# Patient Record
Sex: MALE | Race: ASIAN | NOT HISPANIC OR LATINO
[De-identification: names, ages, dates, MRNs, and addresses within clinical notes are randomized per-mention and may not be internally consistent; named-entity substitution may affect disease eponyms.]

---

## 2020-12-04 ENCOUNTER — APPOINTMENT (OUTPATIENT)
Dept: UROLOGY | Facility: CLINIC | Age: 65
End: 2020-12-04
Payer: MEDICARE

## 2020-12-04 VITALS
OXYGEN SATURATION: 97 % | RESPIRATION RATE: 18 BRPM | TEMPERATURE: 98.1 F | BODY MASS INDEX: 23.07 KG/M2 | SYSTOLIC BLOOD PRESSURE: 117 MMHG | DIASTOLIC BLOOD PRESSURE: 84 MMHG | WEIGHT: 147 LBS | HEART RATE: 71 BPM | HEIGHT: 67 IN

## 2020-12-04 DIAGNOSIS — Z78.9 OTHER SPECIFIED HEALTH STATUS: ICD-10-CM

## 2020-12-04 DIAGNOSIS — Z80.9 FAMILY HISTORY OF MALIGNANT NEOPLASM, UNSPECIFIED: ICD-10-CM

## 2020-12-04 PROCEDURE — 99072 ADDL SUPL MATRL&STAF TM PHE: CPT

## 2020-12-04 PROCEDURE — 99204 OFFICE O/P NEW MOD 45 MIN: CPT

## 2020-12-04 NOTE — ADDENDUM
[FreeTextEntry1] : Entered by Don Contreras, acting as scribe for Dr. Pepe Shin.\par \par The documentation recorded by the scribe accurately reflects the service I personally performed and the decisions made by me.\par

## 2020-12-04 NOTE — LETTER BODY
[FreeTextEntry1] : Julien Benz MD\par [address]\[place] [address]\par [phone]\par \par Dear Dr. Benz,\par \par Reason for Visit: BPH. Elevated PSA.\par \par This is a 65 year-old Cantonese-speaking retired gentleman with elevated PSA and symptoms of BPH. Patient is here today for evaluation. Patient reports he has weak uroflow, frequency, and hesitancy. He denies any hematuria or urinary incontinence. His symptoms are aggravated by hydration. He denies any alleviating factors. He has not tried any medical therapy previously. He reports no pain. His recent PSA was 78. Patient reports he has not had a previous prostate biopsy. All other review of systems are negative. He has cancer in his family medical history through his father. He has no previous surgical history. Past medical history, family history and social history were inquired and were noncontributory to current condition. The patient does not use tobacco or drink alcohol. Medications and allergies were reviewed. He has no known allergies to medication. \par \par On examination, the patient is a healthy-appearing gentleman in no acute distress. He is alert and oriented follows commands. He has normal mood and affect. He is normocephalic. Neck is supple. Oral no thrush Respirations are unlabored. His abdomen is soft and nontender. Bladder is nonpalpable. No CVA tenderness. Neurologically he is grossly intact. No peripheral edema. Skin without gross abnormality. He has normal male external genitalia. Normal meatus. Bilateral testes are descended intrascrotally and normal to palpation. On rectal examination, there is normal sphincter tone. The prostate is clinically benign without focal induration or nodularity.\par \par ASSESSMENT: BPH. Elevated PSA.\par \par I counseled the patient on the various etiology of his symptoms. I discussed the natural history of BPH and the treatment options available. I discussed the options of conservative management with fluid in dietary restrictions, herbal therapy, medical therapy, and minimally invasive procedures.  Risk and benefits were discussed. I answered his questions. I recommended he begin a trial of Flomax. I discussed the potential side effects of the medication. I counseled the patient on its use and side effects. If the patient develops any side effects, the patient will discontinue the medication and contact me. He will obtain BMP today to establish baseline. In terms of his elevated PSA,  I discussed with him the risk of occult malignancy. I discussed the various etiologies of PSA elevation, including enlargement of prostate, inflammation or infection, and prostate cancer. Patient would like to confirm the diagnosis with repeating the PSA. I recommended he obtain a prostate MRI for further evaluation.  I counseled the patient regarding the procedure. The risks and benefits were discussed. Alternatives were given. I answered the patient questions. The patient will take the necessary preparations for the procedure, including fleet enema. If his PSA remains elevated, then he may then consider a prostate biopsy. He will also obtain urine culture and urinalysis today to evaluate for infection. Risks and alternatives were discussed. I answered the patient questions. The patient will follow-up as directed and will contact me with any questions or concerns. Thank you for the opportunity to participate in the care of Mr. SHAW. I will keep you updated on his progress.\par \par Plan: Repeat PSA. Trial of Flomax. Urine culture. Urinalysis. BMP. Prostate MRI. Fleet enema. Follow up in 1 month.

## 2020-12-08 LAB
ANION GAP SERPL CALC-SCNC: 10 MMOL/L
APPEARANCE: CLEAR
BACTERIA UR CULT: NORMAL
BACTERIA: NEGATIVE
BILIRUBIN URINE: NEGATIVE
BLOOD URINE: NEGATIVE
BUN SERPL-MCNC: 13 MG/DL
CALCIUM SERPL-MCNC: 10 MG/DL
CHLORIDE SERPL-SCNC: 103 MMOL/L
CO2 SERPL-SCNC: 25 MMOL/L
COLOR: NORMAL
CREAT SERPL-MCNC: 0.86 MG/DL
GLUCOSE QUALITATIVE U: NEGATIVE
GLUCOSE SERPL-MCNC: 105 MG/DL
HYALINE CASTS: 0 /LPF
KETONES URINE: NEGATIVE
LEUKOCYTE ESTERASE URINE: NEGATIVE
MICROSCOPIC-UA: NORMAL
NITRITE URINE: NEGATIVE
PH URINE: 6.5
POTASSIUM SERPL-SCNC: 4.3 MMOL/L
PROTEIN URINE: NEGATIVE
PSA FREE FLD-MCNC: 10 %
PSA FREE SERPL-MCNC: 8.14 NG/ML
PSA SERPL-MCNC: 79.5 NG/ML
RED BLOOD CELLS URINE: 1 /HPF
SODIUM SERPL-SCNC: 138 MMOL/L
SPECIFIC GRAVITY URINE: 1.01
SQUAMOUS EPITHELIAL CELLS: 0 /HPF
UROBILINOGEN URINE: NORMAL
WHITE BLOOD CELLS URINE: 1 /HPF

## 2020-12-09 ENCOUNTER — NON-APPOINTMENT (OUTPATIENT)
Age: 65
End: 2020-12-09

## 2020-12-15 ENCOUNTER — NON-APPOINTMENT (OUTPATIENT)
Age: 65
End: 2020-12-15

## 2020-12-15 ENCOUNTER — APPOINTMENT (OUTPATIENT)
Dept: UROLOGY | Facility: CLINIC | Age: 65
End: 2020-12-15
Payer: MEDICARE

## 2020-12-15 VITALS
SYSTOLIC BLOOD PRESSURE: 109 MMHG | RESPIRATION RATE: 18 BRPM | DIASTOLIC BLOOD PRESSURE: 74 MMHG | TEMPERATURE: 97.8 F | WEIGHT: 141 LBS | OXYGEN SATURATION: 97 % | BODY MASS INDEX: 22.08 KG/M2 | HEART RATE: 98 BPM

## 2020-12-15 PROCEDURE — 99214 OFFICE O/P EST MOD 30 MIN: CPT

## 2020-12-15 PROCEDURE — 99072 ADDL SUPL MATRL&STAF TM PHE: CPT

## 2020-12-15 NOTE — LETTER BODY
[FreeTextEntry1] : Julien Benz MD\par [address]\[place] [address]\par [phone]\par \par Dear Dr. Benz,\par \par Reason for Visit: BPH. Elevated PSA.\par \par This is a 65 year-old Cantonese-speaking retired gentleman with elevated PSA and BPH. He returns today for follow-up. Since his last visit, he obtained a prostate MRI, which appears suspicious for malignancy. However, the final results are still pending. The patient reports taking Flomax QD regularly without any side effects or difficulties with the medication. The patient denies any hematuria or urinary incontinence. Patient denies any pain. All other review of systems are negative. Past medical history, family history and social history were unchanged. Medications and allergies were reviewed. He has no known allergies to medication. \par \par On examination, the patient is a healthy-appearing gentleman in no acute distress. He is alert and oriented follows commands. He has normal mood and affect. He is normocephalic. Neck is supple. Oral no thrush Respirations are unlabored. His abdomen is soft and nontender. Bladder is nonpalpable. No CVA tenderness. Neurologically he is grossly intact. No peripheral edema. Skin without gross abnormality. He has normal male external genitalia. Normal meatus. Bilateral testes are descended intrascrotally and normal to palpation. On rectal examination, there is normal sphincter tone. The prostate is clinically benign without focal induration or nodularity.\par \par His BMP demonstrated normal renal functions, creatinine 0.86. His PSA was 79.5, which is within normal limits. His urinalysis was unremarkable. His urine culture was negative.\par \par His recent prostate MRI appears suspicious for malignancy.\par \par ASSESSMENT: BPH. Elevated PSA.\par \par I counseled the patient. In terms of his BPH, I recommended the patient continue taking Flomax QD regularly as directed. In terms of his elevated PSA, I discussed with the patient that his prostate MRI appears suspicious for malignancy. However, the final report is still pending. I recommended the patient undergo fusion prostate biopsy for further evaluation of his condition. I counseled the patient regarding the procedure. The risks and benefits were discussed. Alternatives were given. I answered the patient questions. The patient will take the necessary preparations for the procedure, including fleet enema and Cefdinir. Risks and alternatives were discussed. I answered the patient questions. The patient will follow-up as directed and will contact me with any questions or concerns. Thank you for the opportunity to participate in the care of Mr. SHAW. I will keep you updated on his progress.\par \par Plan: Continue Flomax. Fusion prostate biopsy. Fleet enema. Cefdinir. Follow-up as directed.

## 2020-12-28 ENCOUNTER — APPOINTMENT (OUTPATIENT)
Dept: UROLOGY | Facility: CLINIC | Age: 65
End: 2020-12-28
Payer: MEDICARE

## 2020-12-28 ENCOUNTER — OUTPATIENT (OUTPATIENT)
Dept: OUTPATIENT SERVICES | Facility: HOSPITAL | Age: 65
LOS: 1 days | End: 2020-12-28
Payer: MEDICARE

## 2020-12-28 ENCOUNTER — TRANSCRIPTION ENCOUNTER (OUTPATIENT)
Age: 65
End: 2020-12-28

## 2020-12-28 VITALS
TEMPERATURE: 97.7 F | SYSTOLIC BLOOD PRESSURE: 120 MMHG | HEART RATE: 83 BPM | DIASTOLIC BLOOD PRESSURE: 78 MMHG | OXYGEN SATURATION: 99 %

## 2020-12-28 DIAGNOSIS — R35.0 FREQUENCY OF MICTURITION: ICD-10-CM

## 2020-12-28 PROCEDURE — 76872 US TRANSRECTAL: CPT | Mod: 26

## 2020-12-28 PROCEDURE — 76942 ECHO GUIDE FOR BIOPSY: CPT | Mod: 26,59

## 2020-12-28 PROCEDURE — 55700: CPT

## 2020-12-28 PROCEDURE — 76377 3D RENDER W/INTRP POSTPROCES: CPT | Mod: 26

## 2020-12-28 PROCEDURE — 76872 US TRANSRECTAL: CPT

## 2020-12-28 PROCEDURE — 76942 ECHO GUIDE FOR BIOPSY: CPT | Mod: 59

## 2020-12-28 RX ORDER — ENEMA 19; 7 G/133ML; G/133ML
7-19 ENEMA RECTAL
Qty: 1 | Refills: 0 | Status: COMPLETED | COMMUNITY
Start: 2020-12-15 | End: 2020-12-28

## 2020-12-29 ENCOUNTER — NON-APPOINTMENT (OUTPATIENT)
Age: 65
End: 2020-12-29

## 2020-12-29 ENCOUNTER — APPOINTMENT (OUTPATIENT)
Dept: UROLOGY | Facility: CLINIC | Age: 65
End: 2020-12-29
Payer: MEDICARE

## 2020-12-29 VITALS
TEMPERATURE: 98 F | SYSTOLIC BLOOD PRESSURE: 109 MMHG | RESPIRATION RATE: 18 BRPM | DIASTOLIC BLOOD PRESSURE: 76 MMHG | WEIGHT: 145 LBS | HEART RATE: 100 BPM | BODY MASS INDEX: 22.71 KG/M2 | OXYGEN SATURATION: 96 %

## 2020-12-29 PROCEDURE — 99072 ADDL SUPL MATRL&STAF TM PHE: CPT

## 2020-12-29 PROCEDURE — 51798 US URINE CAPACITY MEASURE: CPT

## 2020-12-29 PROCEDURE — 51702 INSERT TEMP BLADDER CATH: CPT

## 2020-12-29 PROCEDURE — 99214 OFFICE O/P EST MOD 30 MIN: CPT | Mod: 25

## 2020-12-29 RX ORDER — CEFDINIR 300 MG/1
300 CAPSULE ORAL
Qty: 6 | Refills: 0 | Status: COMPLETED | COMMUNITY
Start: 2020-12-15 | End: 2020-12-29

## 2020-12-30 DIAGNOSIS — R97.20 ELEVATED PROSTATE SPECIFIC ANTIGEN [PSA]: ICD-10-CM

## 2020-12-30 LAB — CORE LAB BIOPSY: NORMAL

## 2020-12-30 NOTE — HISTORY OF PRESENT ILLNESS
[FreeTextEntry1] : Reason for visit urine retention\par \par Patient with prostate biopsy yesterday and has been unable to urinate since this morning.  He has been taking Flomax regularly.\par \par PVR was 500 cc.\par \par A new 16 [place] catheter was placed without difficulty.  The patient was covered with ciprofloxacin.  Patient will follow-up in one month for catheter change.  Approximately 500 cc of clear urine was drained from the bladder.\par \par Patient will increase Flomax to twice daily.  He will return in 1 week for voiding trial.

## 2021-01-05 ENCOUNTER — APPOINTMENT (OUTPATIENT)
Dept: UROLOGY | Facility: CLINIC | Age: 66
End: 2021-01-05
Payer: MEDICARE

## 2021-01-05 VITALS
OXYGEN SATURATION: 97 % | HEART RATE: 87 BPM | BODY MASS INDEX: 22.24 KG/M2 | DIASTOLIC BLOOD PRESSURE: 80 MMHG | SYSTOLIC BLOOD PRESSURE: 120 MMHG | WEIGHT: 142 LBS | RESPIRATION RATE: 18 BRPM | TEMPERATURE: 98 F

## 2021-01-05 PROCEDURE — 99072 ADDL SUPL MATRL&STAF TM PHE: CPT

## 2021-01-05 PROCEDURE — 99214 OFFICE O/P EST MOD 30 MIN: CPT | Mod: 25

## 2021-01-05 PROCEDURE — 51700 IRRIGATION OF BLADDER: CPT

## 2021-01-05 PROCEDURE — A4218: CPT | Mod: NC

## 2021-01-05 RX ORDER — ALPRAZOLAM 0.5 MG/1
0.5 TABLET, EXTENDED RELEASE ORAL DAILY
Qty: 14 | Refills: 0 | Status: DISCONTINUED | COMMUNITY
Start: 2021-01-05 | End: 2021-01-05

## 2021-01-11 NOTE — LETTER BODY
[FreeTextEntry1] : Julien Benz MD\par [address]\[place] [address]\par [phone]\par \par Dear Dr. Benz,\par \par Reason for visit: Prostate cancer. \par \par This is a 65 year-old gentleman with elevated PSA, status post prostate biopsy. Patient returns today for trial of void and to discuss the results of the biopsy. He is accompanied by his wife. He was unable to urinate the next morning following his prostate biopsy last week. A Hill catheter was placed. He has some mild hematuria which is improving. He denies any fevers or chills. He denies any pain. The patient is currently taking Casodex as directed. Patient denies any changes in health. The past medical history and family history and social history are unchanged. All other review of systems are negative. Patient denies any changes in medications. Medication list was reconciled.\par \par On examination, the patient is a healthy-appearing gentleman in no acute distress. He is alert and oriented follows commands. He has normal mood and affect. He is normocephalic. Neck is supple. Oral no thrush Respirations are unlabored. His abdomen is soft and nontender. Bladder is nonpalpable. No CVA tenderness. Neurologically he is grossly intact. No peripheral edema. Skin without gross abnormality. He has normal male external genitalia. Normal meatus. Bilateral testes are descended intrascrotally and normal to palpation. On rectal examination, there is normal sphincter tone. The prostate is clinically benign without focal induration or nodularity.\par \par Prostate biopsy demonstrated evidence of [place] 9 adenocarcinoma of the prostate involving 15 of 15 cores.\par \par Patient was given a voiding trial today. Patient was able to void spontaneously.\par \par Assessment: Advanced prostate cancer.\par \par I counseled the patient on its clinical significance. I advised the patient that he has advanced prostate cancer. I discussed the risk of metastatic disease and the probability of organ confined disease. I discussed the treatment options available to him including expectant management, hormonal therapy, radiation, and surgery. The risks and benefits of each options were discussed in detail as well quality of life issues. I answered his questions. I recommended he consider radiation therapy and see radiation oncology. He is currently taking Casodex in preparation for hormonal therapy. He will follow-up in 2 weeks to begin Eligard injections. The patient will obtain CT pelvis and NM SPECT bone scan to evaluate for metastatic disease. Risks and alternatives were discussed. I answered the patient questions. The patient will follow-up as directed and will contact me with any questions or concerns. Thank you for the opportunity to participate in the care of Mr. SHAW. I will keep you updated on his progress.\par \par Plan: CT pelvis. Bone scan. Continue Casodex. Follow-up in 2 weeks for Eligard.

## 2021-01-18 RX ORDER — PSYLLIUM HUSK 0.4 G
1000-800 CAPSULE ORAL
Refills: 0 | Status: ACTIVE | COMMUNITY
Start: 2021-01-18 | End: 1900-01-01

## 2021-01-19 ENCOUNTER — APPOINTMENT (OUTPATIENT)
Dept: UROLOGY | Facility: CLINIC | Age: 66
End: 2021-01-19
Payer: MEDICARE

## 2021-01-19 VITALS
BODY MASS INDEX: 22.24 KG/M2 | SYSTOLIC BLOOD PRESSURE: 100 MMHG | RESPIRATION RATE: 18 BRPM | DIASTOLIC BLOOD PRESSURE: 71 MMHG | OXYGEN SATURATION: 98 % | HEART RATE: 100 BPM | WEIGHT: 142 LBS | TEMPERATURE: 97.3 F

## 2021-01-19 PROCEDURE — 99072 ADDL SUPL MATRL&STAF TM PHE: CPT

## 2021-01-19 PROCEDURE — 96402 CHEMO HORMON ANTINEOPL SQ/IM: CPT

## 2021-01-19 PROCEDURE — 99214 OFFICE O/P EST MOD 30 MIN: CPT | Mod: 25

## 2021-01-19 RX ORDER — LEUPROLIDE ACETATE 45 MG/.375ML
45 INJECTION, SUSPENSION, EXTENDED RELEASE SUBCUTANEOUS
Refills: 0 | Status: COMPLETED | OUTPATIENT
Start: 2021-01-19

## 2021-01-19 RX ADMIN — LEUPROLIDE ACETATE 0 MG: KIT SUBCUTANEOUS at 00:00

## 2021-01-19 NOTE — LETTER BODY
[FreeTextEntry1] : Julien Benz MD\par [address]\[place] [address]\par [phone]\par \par Dear Dr. Benz,\par \par REASON FOR VISIT: Prostate cancer. \par  \par This is a 65 year-old gentleman with prostate cancer. The patient returns for Eligard injection. Since he was last seen, the patient obtained an abnormal bone scan, which demonstrated an indeterminate lesion on the left femur. The patient notes anxiety. He requests an anxiolytic. He denies any interval complaints or difficulties. Patient reports no pain. He has been doing well. Patient denies any changes in health. The past medical history and family history and social history are unchanged. All other review of systems are negative. Patient denies any changes in medications. Medication list was reconciled. \par  \par He is currently taking calcium supplements and vitamin D for osteoporosis. \par \par On examination, the patient is a healthy-appearing gentleman in no acute distress. He is alert and oriented follows commands. He has normal mood and affect. He is normocephalic. Oral no thrush. Neck is supple. Respirations are unlabored. His abdomen is soft and nontender. Liver is nonpalpable. Bladder is nonpalpable. No CVA tenderness. Neurologically he is grossly intact. No peripheral edema. Skin without gross abnormality.\par \par The patient's right lower abdomen was cleaned with alcohol, 45 mg Eligard was applied IM. Patient tolerated procedure well.\par \par Assessment: Prostate cancer on the androgen ablation. Abnormal bone scan.\par  \par I counseled the patient. I encourage patient to continue with Eligard as LHRH agonist will help suppress his prostate cancer. He will obtain PSA and testosterone to monitor efficacy of treatment. Given his abnormal bone scan, I recommended the patient undergo noncontrast hip MRI for further evaluation. Given his anxiety and his request for an anxiolytic, I recommended the patient begin a trial of ALPRAZolam. I discussed the potential side effects of the medication. I counseled the patient on its use and side effects. If the patient develops any side effects, the patient will discontinue the medication and contact me. I also recommended the patient see you for further evaluation regarding his anxiety. Risks and alternatives were discussed. I answered the patient questions. The patient will follow-up as directed and will contact me with any questions or concerns. He will return in six months time for Eligard injection. Thank you for the opportunity to participate in the care of this patient. I'll keep you updated on his progress.\par  \par Plan: PSA. Testosterone. Hip MRI. Trial of ALPRAZolam. See PCP. Eligard in 6 months.\par \par I spent over half of the 30-minute encounter counseling the patient and coordinating his care.

## 2021-04-20 ENCOUNTER — APPOINTMENT (OUTPATIENT)
Dept: UROLOGY | Facility: CLINIC | Age: 66
End: 2021-04-20

## 2021-07-20 ENCOUNTER — APPOINTMENT (OUTPATIENT)
Dept: UROLOGY | Facility: CLINIC | Age: 66
End: 2021-07-20
Payer: MEDICARE

## 2021-07-20 VITALS
SYSTOLIC BLOOD PRESSURE: 118 MMHG | HEART RATE: 101 BPM | OXYGEN SATURATION: 99 % | TEMPERATURE: 97.6 F | DIASTOLIC BLOOD PRESSURE: 82 MMHG | WEIGHT: 140 LBS | BODY MASS INDEX: 21.93 KG/M2 | RESPIRATION RATE: 16 BRPM

## 2021-07-20 LAB
PSA SERPL-MCNC: 0.02 NG/ML
TESTOST SERPL-MCNC: 14.7 NG/DL

## 2021-07-20 PROCEDURE — 99214 OFFICE O/P EST MOD 30 MIN: CPT | Mod: 25

## 2021-07-20 PROCEDURE — 99072 ADDL SUPL MATRL&STAF TM PHE: CPT

## 2021-07-20 PROCEDURE — 96402 CHEMO HORMON ANTINEOPL SQ/IM: CPT

## 2021-07-20 RX ORDER — BICALUTAMIDE 50 MG/1
50 TABLET ORAL
Qty: 30 | Refills: 0 | Status: COMPLETED | COMMUNITY
Start: 2020-12-30 | End: 2021-01-30

## 2021-07-20 RX ORDER — BROMFENAC SODIUM 0.7 MG/ML
0.07 SOLUTION/ DROPS OPHTHALMIC
Qty: 3 | Refills: 0 | Status: DISCONTINUED | COMMUNITY
Start: 2019-10-02 | End: 2021-07-20

## 2021-07-20 RX ORDER — OMEPRAZOLE 40 MG/1
40 CAPSULE, DELAYED RELEASE ORAL
Refills: 0 | Status: DISCONTINUED | COMMUNITY
End: 2021-07-20

## 2021-07-20 RX ORDER — LEUPROLIDE ACETATE 45 MG/.375ML
45 INJECTION, SUSPENSION, EXTENDED RELEASE SUBCUTANEOUS
Refills: 0 | Status: COMPLETED | OUTPATIENT
Start: 2021-07-20

## 2021-07-20 RX ORDER — PREDNISOLONE ACETATE 10 MG/ML
1 SUSPENSION/ DROPS OPHTHALMIC
Qty: 5 | Refills: 0 | Status: DISCONTINUED | COMMUNITY
Start: 2019-10-02 | End: 2021-07-20

## 2021-07-20 RX ORDER — LEUPROLIDE ACETATE 45 MG/.375ML
45 INJECTION, SUSPENSION, EXTENDED RELEASE SUBCUTANEOUS
Qty: 1 | Refills: 0 | Status: COMPLETED | OUTPATIENT
Start: 2021-01-19 | End: 2021-07-20

## 2021-07-20 RX ORDER — ACETAMINOPHEN 500 MG/1
500 TABLET, COATED ORAL
Refills: 0 | Status: DISCONTINUED | COMMUNITY
Start: 2021-01-19 | End: 2021-07-20

## 2021-07-20 RX ORDER — LEUPROLIDE ACETATE 45 MG/.375ML
45 INJECTION, SUSPENSION, EXTENDED RELEASE SUBCUTANEOUS
Qty: 1 | Refills: 0 | Status: COMPLETED | OUTPATIENT
Start: 2021-07-19 | End: 2021-07-20

## 2021-07-20 RX ADMIN — LEUPROLIDE ACETATE 0 MG: KIT SUBCUTANEOUS at 00:00

## 2021-08-05 NOTE — LETTER BODY
[FreeTextEntry1] : Julien Benz MD\par [address]\[place] [address]\par [phone]\par \par Dear Dr. Benz,\par \par REASON FOR VISIT: Prostate cancer. \par  \par This is a 65 year-old gentleman with prostate cancer. The patient returns for Eligard injection. Since he was last seen, the patient obtained an abnormal bone scan, which demonstrated an indeterminate lesion on the left femur. His subsequent hip MRI was unremarkable. The patient notes taking ALPRAZolam for his anxiety without any side effects or difficulties. He denies any interval complaints or difficulties. Patient reports no pain. He has been doing well. Patient denies any changes in health. The past medical history and family history and social history are unchanged. All other review of systems are negative. Patient denies any changes in medications. Medication list was reconciled. \par  \par He is currently taking calcium supplements and vitamin D for osteoporosis. \par \par On examination, the patient is a healthy-appearing gentleman in no acute distress. He is alert and oriented follows commands. He has normal mood and affect. He is normocephalic. Oral no thrush. Neck is supple. Respirations are unlabored. His abdomen is soft and nontender. Liver is nonpalpable. Bladder is nonpalpable. No CVA tenderness. Neurologically he is grossly intact. No peripheral edema. Skin without gross abnormality.\par \par The patient's right lower abdomen was cleaned with alcohol, 45 mg Eligard was applied IM. Patient tolerated procedure well.\par \par I personally reviewed MRI images with the patient today and images demonstrated 1.9 cm fluid sensitive hyperintensity along the lateral femoral head neck junction. May represent benign synovial herniation pit/ cystic change.\par \par Assessment: Prostate cancer on the androgen ablation. Abnormal bone scan. Anxiety. \par  \par I counseled the patient. I encourage patient to continue with Eligard as LHRH agonist will help suppress his prostate cancer. He will obtain PSA and testosterone to monitor efficacy of treatment. In terms of his previous abnormal bone scan, his hip MRI was unremarkable. In terms of his anxiety, I recommended the patient continue ALPRAZolam. I renewed the patient's prescription for ALPRAZolam  today. I encouraged the patient to continue medications regularly as directed.  Risks and alternatives were discussed. I answered the patient questions. The patient will follow-up as directed and will contact me with any questions or concerns. He will return in six months time for Eligard injection. Thank you for the opportunity to participate in the care of this patient. I'll keep you updated on his progress.\par  \par Plan: PSA. Testosterone. Hip MRI. Continue ALPRAZolam.  Eligard in 6 months.\par \par I spent over half of the 30-minute encounter counseling the patient and coordinating his care.

## 2021-08-05 NOTE — ADDENDUM
[FreeTextEntry1] : Entered by iD Villalpando, acting as scribe for Dr. Pepe Shin.\par \par The documentation recorded by the scribe accurately reflects the service I personally performed and the decisions made by me.

## 2022-01-18 ENCOUNTER — APPOINTMENT (OUTPATIENT)
Dept: UROLOGY | Facility: CLINIC | Age: 67
End: 2022-01-18
Payer: MEDICARE

## 2022-01-18 VITALS
DIASTOLIC BLOOD PRESSURE: 89 MMHG | OXYGEN SATURATION: 99 % | BODY MASS INDEX: 22.87 KG/M2 | SYSTOLIC BLOOD PRESSURE: 130 MMHG | RESPIRATION RATE: 16 BRPM | HEART RATE: 100 BPM | WEIGHT: 146 LBS | TEMPERATURE: 97.2 F

## 2022-01-18 PROCEDURE — 99213 OFFICE O/P EST LOW 20 MIN: CPT | Mod: 25

## 2022-01-18 PROCEDURE — 96402 CHEMO HORMON ANTINEOPL SQ/IM: CPT

## 2022-01-18 RX ORDER — ZOLPIDEM TARTRATE 5 MG/1
5 TABLET ORAL
Qty: 30 | Refills: 0 | Status: DISCONTINUED | COMMUNITY
Start: 2020-10-29 | End: 2022-01-18

## 2022-01-18 RX ORDER — LEUPROLIDE ACETATE 45 MG/.375ML
45 INJECTION, SUSPENSION, EXTENDED RELEASE SUBCUTANEOUS
Refills: 0 | Status: COMPLETED | OUTPATIENT
Start: 2022-01-18

## 2022-01-18 RX ORDER — AMLODIPINE BESYLATE 5 MG/1
5 TABLET ORAL
Refills: 0 | Status: DISCONTINUED | COMMUNITY
End: 2022-01-18

## 2022-01-18 RX ORDER — LEUPROLIDE ACETATE 45 MG/.375ML
45 INJECTION, SUSPENSION, EXTENDED RELEASE SUBCUTANEOUS
Qty: 1 | Refills: 0 | Status: COMPLETED | OUTPATIENT
Start: 2022-01-18 | End: 2022-01-18

## 2022-01-18 RX ORDER — PSYLLIUM HUSK 0.4 G
1000-800 CAPSULE ORAL
Qty: 90 | Refills: 3 | Status: ACTIVE | COMMUNITY
Start: 2022-01-18 | End: 1900-01-01

## 2022-01-18 RX ADMIN — LEUPROLIDE ACETATE 0 MG: KIT SUBCUTANEOUS at 00:00

## 2022-01-19 LAB
PSA SERPL-MCNC: <0.01 NG/ML
TESTOST SERPL-MCNC: 9.3 NG/DL

## 2022-01-19 NOTE — LETTER BODY
[FreeTextEntry1] : Julien Benz MD\par [address]\[place] [address]\par [phone]\par \par Dear Dr. Benz,\par \par REASON FOR VISIT: Prostate cancer. \par  \par This is a 66 year-old gentleman with prostate cancer. His abnormal bone scan demonstrated an indeterminate lesion on the left femur. His subsequent hip MRI was unremarkable. The patient returns for Eligard injection. Since he was last seen, the patient notes taking ALPRAZolam for his anxiety without any side effects or difficulties. He requests additional ALPRAZolam. He denies any interval complaints or difficulties. Patient reports no pain. He has been doing well. Patient denies any changes in health. The past medical history and family history and social history are unchanged. All other review of systems are negative. Patient denies any changes in medications. Medication list was reconciled. \par  \par He is currently taking calcium supplements and vitamin D for osteoporosis. \par \par On examination, the patient is a healthy-appearing gentleman in no acute distress. He is alert and oriented follows commands. He has normal mood and affect. He is normocephalic. Oral no thrush. Neck is supple. Respirations are unlabored. His abdomen is soft and nontender. Liver is nonpalpable. Bladder is nonpalpable. No CVA tenderness. Neurologically he is grossly intact. No peripheral edema. Skin without gross abnormality.\par \par His PSA in July 2021 was 0.02, which is within normal limits. His testosterone was 14.7. \par \par The patient's right lower abdomen was cleaned with alcohol, 45 mg Eligard was applied IM. Patient tolerated procedure well.\par \par Assessment: Prostate cancer on the androgen ablation. Anxiety. \par  \par I counseled the patient. I encourage patient to continue with Eligard as LHRH agonist will help suppress his prostate cancer. He will obtain PSA and testosterone to monitor efficacy of treatment. In terms of his anxiety, I renewed the patient's prescription for ALPRAZolam today. I encouraged the patient to continue medications regularly as directed. I also encouraged the patient to follow up with his PCP.  Risks and alternatives were discussed. I answered the patient questions. The patient will follow-up as directed and will contact me with any questions or concerns. He will return in six months time for Eligard injection. Thank you for the opportunity to participate in the care of this patient. I'll keep you updated on his progress.\par  \par Plan: PSA. Testosterone. Continue ALPRAZolam. See PCP. Eligard in 6 months.\par \par I spent over half of the 30-minute encounter counseling the patient and coordinating his care.

## 2022-01-19 NOTE — ADDENDUM
[FreeTextEntry1] : Entered by Di Villalpando, acting as scribe for Dr. Pepe Shin.\par \par The documentation recorded by the scribe accurately reflects the service I personally performed and the decisions made by me.

## 2022-01-19 NOTE — HISTORY OF PRESENT ILLNESS
[FreeTextEntry1] : Please refer to [facility] Consult note \par \par Eligard right side.\par PSA 0.02 in July of last year.\par Patient has mild anxiety disorder requests additional alprazolam.\par Encouraged him to follow-up with his PCP \par Follow-up in 6 months

## 2022-07-19 ENCOUNTER — APPOINTMENT (OUTPATIENT)
Dept: UROLOGY | Facility: CLINIC | Age: 67
End: 2022-07-19

## 2022-07-19 VITALS
HEART RATE: 97 BPM | OXYGEN SATURATION: 96 % | SYSTOLIC BLOOD PRESSURE: 127 MMHG | TEMPERATURE: 97.7 F | BODY MASS INDEX: 24.35 KG/M2 | WEIGHT: 155.5 LBS | DIASTOLIC BLOOD PRESSURE: 88 MMHG | RESPIRATION RATE: 18 BRPM

## 2022-07-19 PROCEDURE — 99213 OFFICE O/P EST LOW 20 MIN: CPT | Mod: 25

## 2022-07-19 PROCEDURE — 96402 CHEMO HORMON ANTINEOPL SQ/IM: CPT

## 2022-07-19 RX ORDER — LEUPROLIDE ACETATE 45 MG/.375ML
45 INJECTION, SUSPENSION, EXTENDED RELEASE SUBCUTANEOUS
Refills: 0 | Status: COMPLETED | OUTPATIENT
Start: 2022-07-19

## 2022-07-19 RX ORDER — ALPRAZOLAM 0.5 MG/1
0.5 TABLET ORAL
Qty: 14 | Refills: 0 | Status: DISCONTINUED | COMMUNITY
Start: 2021-01-05 | End: 2022-07-19

## 2022-07-19 RX ORDER — ZOLPIDEM TARTRATE 10 MG/1
10 TABLET ORAL
Qty: 30 | Refills: 0 | Status: ACTIVE | COMMUNITY
Start: 2022-06-22

## 2022-07-19 RX ORDER — LEUPROLIDE ACETATE 45 MG/.375ML
45 INJECTION, SUSPENSION, EXTENDED RELEASE SUBCUTANEOUS
Qty: 1 | Refills: 0 | Status: COMPLETED | OUTPATIENT
Start: 2022-07-19 | End: 2022-07-19

## 2022-07-19 RX ORDER — PSYLLIUM HUSK 0.4 G
1000-800 CAPSULE ORAL
Qty: 90 | Refills: 3 | Status: ACTIVE | COMMUNITY
Start: 2022-07-19 | End: 1900-01-01

## 2022-07-19 RX ADMIN — LEUPROLIDE ACETATE 0 MG: KIT SUBCUTANEOUS at 00:00

## 2022-07-19 NOTE — LETTER BODY
[FreeTextEntry1] : Julien Benz MD\par [address]\[place] [address]\par [phone]\par \par Dear Dr. Benz,\par \par REASON FOR VISIT: Prostate cancer. \par  \par This is a 66 year-old gentleman with prostate cancer. His abnormal bone scan demonstrated an indeterminate lesion on the left femur. His subsequent hip MRI was unremarkable. The patient returns for Eligard injection. Since he was last seen, the patient notes taking ALPRAZolam for his anxiety without any side effects or difficulties. He requests additional ALPRAZolam. He denies any interval complaints or difficulties. Patient reports no pain. He has been doing well. Patient denies any changes in health. The past medical history and family history and social history are unchanged. All other review of systems are negative. Patient denies any changes in medications. Medication list was reconciled. \par  \par He is currently taking calcium supplements and vitamin D for osteoporosis. \par \par On examination, the patient is a healthy-appearing gentleman in no acute distress. He is alert and oriented follows commands. He has normal mood and affect. He is normocephalic. Oral no thrush. Neck is supple. Respirations are unlabored. His abdomen is soft and nontender. Liver is nonpalpable. Bladder is nonpalpable. No CVA tenderness. Neurologically he is grossly intact. No peripheral edema. Skin without gross abnormality.\par \par His PSA was 0.1, which is within normal limits. His testosterone in July 2021 was 14.7. \par \par The patient's right lower abdomen was cleaned with alcohol, 45 mg Eligard was applied IM. Patient tolerated procedure well.\par \par Assessment: Prostate cancer on the androgen ablation. Anxiety. \par  \par I counseled the patient.  Given his clinical response, I encourage patient to continue with Eligard as LHRH agonist will help suppress his prostate cancer. He will obtain PSA and testosterone to monitor efficacy of treatment. In terms of his anxiety, I recommended the patient continue taking ALPRAZolam. I encouraged the patient to continue medications regularly as directed. I also encouraged the patient to follow up with his PCP. Risks and alternatives were discussed. I answered the patient questions. The patient will follow-up as directed and will contact me with any questions or concerns. He will return in six months time for follow up. Thank you for the opportunity to participate in the care of this patient. I'll keep you updated on his progress.\par  \par Plan: Follow up in 6 months.\par

## 2023-01-17 ENCOUNTER — APPOINTMENT (OUTPATIENT)
Dept: UROLOGY | Facility: CLINIC | Age: 68
End: 2023-01-17
Payer: MEDICARE

## 2023-01-17 VITALS
DIASTOLIC BLOOD PRESSURE: 81 MMHG | TEMPERATURE: 96.8 F | RESPIRATION RATE: 18 BRPM | HEART RATE: 96 BPM | OXYGEN SATURATION: 99 % | WEIGHT: 150 LBS | SYSTOLIC BLOOD PRESSURE: 112 MMHG | BODY MASS INDEX: 23.49 KG/M2

## 2023-01-17 DIAGNOSIS — R94.8 ABNORMAL RESULTS OF FUNCTION STUDIES OF OTHER ORGANS AND SYSTEMS: ICD-10-CM

## 2023-01-17 LAB — PSA SERPL-MCNC: <0.01 NG/ML

## 2023-01-17 PROCEDURE — 99214 OFFICE O/P EST MOD 30 MIN: CPT

## 2023-01-17 RX ORDER — CALCIUM CARBONATE/VITAMIN D3 600 MG-20
600-20 TABLET ORAL
Qty: 90 | Refills: 3 | Status: ACTIVE | COMMUNITY
Start: 2021-07-19 | End: 1900-01-01

## 2023-01-17 RX ORDER — ALPRAZOLAM 0.5 MG/1
0.5 TABLET ORAL
Qty: 14 | Refills: 0 | Status: ACTIVE | COMMUNITY
Start: 2023-01-17 | End: 1900-01-01

## 2023-01-17 NOTE — LETTER BODY
[FreeTextEntry1] : Julien Benz MD\par [address]\[place] [address]\par [phone]\par \par Dear Dr. Benz,\par \par REASON FOR VISIT: BPH. Prostate cancer. Anxiety.\par  \par This is a 67 year-old gentleman with prostate cancer. His abnormal bone scan demonstrated an indeterminate lesion on the left femur. His subsequent hip MRI was unremarkable. The patient underwent radiation therapy 2 years ago. The patient returns for follow up. Since he was last seen, the patient reports occasional hot flashes after his radiation therapy 2 years ago. The patient reports taking Flomax BID regularly without any difficulties or side effects. Patient reports improvement of symptoms on medical therapy. The patient notes taking ALPRAZolam 0.5 mg for his anxiety and difficulty sleeping without any side effects or difficulties. He requests additional ALPRAZolam. He denies any interval complaints or difficulties. Patient reports no pain. Patient denies any changes in health. The past medical history and family history and social history are unchanged. All other review of systems are negative. Patient denies any changes in medications. Medication list was reconciled. \par  \par He is currently taking calcium supplements and vitamin D for osteoporosis. \par \par On examination, the patient is a healthy-appearing gentleman in no acute distress. He is alert and oriented follows commands. He has normal mood and affect. He is normocephalic. Oral no thrush. Neck is supple. Respirations are unlabored. His abdomen is soft and nontender. Liver is nonpalpable. Bladder is nonpalpable. No CVA tenderness. Neurologically he is grossly intact. No peripheral edema. Skin without gross abnormality.\par \par His CMP from June 2022 demonstrated normal renal functions, creatinine 0.90. His PSA was <0.1, which is undetectable.\par \par Assessment: BPH. Prostate cancer on the androgen ablation. Anxiety. \par  \par I counseled the patient. In terms of his BPH, the patient reports table symptoms on Flomax BID. In terms of his prostate cancer, the patient underwent radiation therapy 2 years ago. His last PSA was <0.1, which is undetectable. I recommended he repeat PSA and testosterone to ensure stability. I recommended he continue taking Calcium supplements and Vitamin D for osteoporosis. In terms of his anxiety, I recommended the patient continue taking ALPRAZolam 0.5 mg. I renewed the patient's prescription for Flomax, Calcium, Vitamin D, and ALPRAZolam today. I encouraged the patient to continue medications regularly as directed. I also encouraged the patient to follow up with his PCP. Risks and alternatives were discussed. I answered the patient questions. The patient will follow-up as directed and will contact me with any questions or concerns. He will return in six months time for follow up. Thank you for the opportunity to participate in the care of this patient. I'll keep you updated on his progress.\par  \par Plan: Continue Flomax BID, Calcium + Vitamin D, and ALPRAZolam 0.5 mg. PSA. Testosterone. Follow up in 6 months.

## 2023-01-17 NOTE — ADDENDUM
[FreeTextEntry1] : Entered by JOSÉ MIGUEL MARCH, acting as scribe for Dr. Pepe Shin.\par The documentation recorded by the scribe accurately reflects the service I personally performed and the decisions made by me.

## 2023-01-18 LAB — TESTOST SERPL-MCNC: 17.8 NG/DL

## 2023-07-25 ENCOUNTER — APPOINTMENT (OUTPATIENT)
Dept: UROLOGY | Facility: CLINIC | Age: 68
End: 2023-07-25
Payer: MEDICARE

## 2023-07-25 VITALS
BODY MASS INDEX: 24.53 KG/M2 | TEMPERATURE: 97.3 F | SYSTOLIC BLOOD PRESSURE: 134 MMHG | WEIGHT: 156.6 LBS | HEART RATE: 80 BPM | DIASTOLIC BLOOD PRESSURE: 90 MMHG | RESPIRATION RATE: 18 BRPM | OXYGEN SATURATION: 94 %

## 2023-07-25 DIAGNOSIS — Z00.00 ENCOUNTER FOR GENERAL ADULT MEDICAL EXAMINATION W/OUT ABNORMAL FINDINGS: ICD-10-CM

## 2023-07-25 PROCEDURE — 99214 OFFICE O/P EST MOD 30 MIN: CPT

## 2023-07-28 NOTE — ADDENDUM
[FreeTextEntry1] : Entered by Joyce Ochoa, acting as scribe for Dr. Pepe Shin.\par The documentation recorded by the scribe accurately reflects the service I personally performed and the decisions made by me.

## 2023-07-28 NOTE — LETTER BODY
[FreeTextEntry1] : Julien Benz MD\par [address]\[place] [address]\par [phone]\par \par Dear Dr. Benz,\par \par REASON FOR VISIT: BPH. Prostate cancer. \par  \par This is a 67 year-old gentleman with prostate cancer. His abnormal bone scan demonstrated an indeterminate lesion on the left femur. His subsequent hip MRI was unremarkable. The patient underwent radiation therapy over  2 years ago. The patient returns for follow up. In terms of prostate cancer, he completed his treatment. Since he was last seen, the patient reports occasional hot flashes after his radiation therapy 2 years ago. The patient reports taking Flomax BID regularly without any difficulties or side effects. Patient reports stable symptoms on medical therapy. He denies any interval complaints or difficulties. Patient reports no pain. Patient denies any changes in health. The past medical history and family history and social history are unchanged. All other review of systems are negative. Patient denies any changes in medications. Medication list was reconciled. \par  \par He is currently taking calcium supplements and vitamin D for osteoporosis. \par \par On examination, the patient is a healthy-appearing gentleman in no acute distress. He is alert and oriented follows commands. He has normal mood and affect. He is normocephalic. Oral no thrush. Neck is supple. Respirations are unlabored. His abdomen is soft and nontender. Liver is nonpalpable. Bladder is nonpalpable. No CVA tenderness. Neurologically he is grossly intact. No peripheral edema. Skin without gross abnormality.\par \par January 2023  PSA was <0.1, which is undetectable.\par \par Assessment: BPH. Prostate cancer on the androgen ablation. \par  \par I counseled the patient. In terms of his prostate cancer, patient completed treatment.  He will repeat PSA today.  In terms of his BPH, the patient reports stable symptoms on Flomax BID. I renewed the patient's prescription for Flomax BID today. I encouraged the patient to continue medications regularly as directed. His last PSA was <0.1, which is undetectable. In terms of his occasional hot flashes, patient declines medication. I recommended he repeat PSA and testosterone to ensure stability. Risks and alternatives were discussed. I answered the patient questions. The patient will follow-up as directed and will contact me with any questions or concerns. He will return in six months time for follow up. Thank you for the opportunity to participate in the care of this patient. I'll keep you updated on his progress.\par  \par Plan: Continue Flomax BID, PSA. Testosterone. Follow up in 6 months. \par

## 2023-08-05 LAB
PSA SERPL-MCNC: 0.02 NG/ML
TESTOST SERPL-MCNC: 171 NG/DL

## 2024-01-23 ENCOUNTER — APPOINTMENT (OUTPATIENT)
Dept: UROLOGY | Facility: CLINIC | Age: 69
End: 2024-01-23
Payer: MEDICARE

## 2024-01-23 VITALS
HEART RATE: 104 BPM | RESPIRATION RATE: 19 BRPM | BODY MASS INDEX: 23.73 KG/M2 | OXYGEN SATURATION: 97 % | WEIGHT: 151.5 LBS | SYSTOLIC BLOOD PRESSURE: 121 MMHG | TEMPERATURE: 96.8 F | DIASTOLIC BLOOD PRESSURE: 84 MMHG

## 2024-01-23 DIAGNOSIS — N13.8 BENIGN PROSTATIC HYPERPLASIA WITH LOWER URINARY TRACT SYMPMS: ICD-10-CM

## 2024-01-23 DIAGNOSIS — R97.20 ELEVATED PROSTATE, SPECIFIC ANTIGEN [PSA]: ICD-10-CM

## 2024-01-23 DIAGNOSIS — N40.1 BENIGN PROSTATIC HYPERPLASIA WITH LOWER URINARY TRACT SYMPMS: ICD-10-CM

## 2024-01-23 DIAGNOSIS — C61 MALIGNANT NEOPLASM OF PROSTATE: ICD-10-CM

## 2024-01-23 PROCEDURE — G2211 COMPLEX E/M VISIT ADD ON: CPT

## 2024-01-23 PROCEDURE — 99214 OFFICE O/P EST MOD 30 MIN: CPT

## 2024-01-23 RX ORDER — TAMSULOSIN HYDROCHLORIDE 0.4 MG/1
0.4 CAPSULE ORAL
Qty: 180 | Refills: 3 | Status: ACTIVE | COMMUNITY
Start: 2020-12-04 | End: 1900-01-01

## 2024-01-23 NOTE — HISTORY OF PRESENT ILLNESS
[FreeTextEntry1] : Follow-up prostate cancer.  Patient PSA 0.02.  There is initial calcium 7.5.  Patient completed radiation therapy. Reviewed PSA.  Follow 6 months.  Follow-up BPH.  Continue Flomax twice daily.  Renewed prescription.  Patient has occasional rectal bleeding.  He declines Proctofoam.  Please refer to [facility] Consult note

## 2024-01-23 NOTE — LETTER BODY
[FreeTextEntry1] : Julien Benz MD  [address]  [place] [address]  [phone]    Dear Dr. Benz,    REASON FOR VISIT: BPH. Prostate cancer.    This is a 68 year-old gentleman with prostate cancer. His abnormal bone scan demonstrated an indeterminate lesion on the left femur. His subsequent hip MRI was unremarkable. The patient underwent radiation therapy previously. The patient returns for follow up. In terms of prostate cancer, he completed his radiation therapy. His PSA is 0.02. There is initial calcium 7.5. The patient reports taking Flomax BID regularly without any difficulties or side effects for his BPH. Patient reports stable symptoms on medical therapy. Patient reports of occasional rectal bleeding. Patient reports no pain. Patient denies any changes in health. The past medical history and family history and social history are unchanged. All other review of systems are negative. Patient denies any changes in medications. Medication list was reconciled.    He is currently taking calcium supplements and vitamin D for osteoporosis.    On examination, the patient is a healthy-appearing gentleman in no acute distress. He is alert and oriented follows commands. He has normal mood and affect. He is normocephalic. Oral no thrush. Neck is supple. Respirations are unlabored. His abdomen is soft and nontender. Liver is nonpalpable. Bladder is nonpalpable. No CVA tenderness. Neurologically he is grossly intact. No peripheral edema. Skin without gross abnormality.    July 2023 PSA was 0.02.    Assessment: BPH. Prostate cancer on the androgen ablation.    I counseled the patient. In terms of his prostate cancer, patient completed radiation. I reviewed his PSA. His PSA was 0.02, WNL.  I recommended he repeat PSA, BMP and testosterone to ensure stability. In terms of his BPH, the patient reports stable symptoms on Flomax BID. I renewed the patient's prescription for Flomax BID today. I encouraged the patient to continue medications regularly as directed. In terms of his occasional rectal bleeding, patient declines Proctofoam. Risks and alternatives were discussed. I answered the patient questions. The patient will follow-up as directed and will contact me with any questions or concerns. He will return in six months time for follow up. Thank you for the opportunity to participate in the care of this patient. I'll keep you updated on his progress.    Plan: Continue Flomax BID, PSA. Testosterone. Follow up in 6 months.

## 2024-01-23 NOTE — ADDENDUM
[FreeTextEntry1] : Entered by Joyce Ochoa, acting as scribe for Dr. Pepe Shin. The documentation recorded by the scribe accurately reflects the service I personally performed and the decisions made by me.

## 2024-01-24 LAB
ANION GAP SERPL CALC-SCNC: 16 MMOL/L
BUN SERPL-MCNC: 13 MG/DL
CALCIUM SERPL-MCNC: 9.7 MG/DL
CHLORIDE SERPL-SCNC: 104 MMOL/L
CO2 SERPL-SCNC: 21 MMOL/L
CREAT SERPL-MCNC: 0.93 MG/DL
EGFR: 89 ML/MIN/1.73M2
GLUCOSE SERPL-MCNC: 103 MG/DL
POTASSIUM SERPL-SCNC: 4.3 MMOL/L
PSA SERPL-MCNC: 0.14 NG/ML
SODIUM SERPL-SCNC: 141 MMOL/L
TESTOST SERPL-MCNC: 389 NG/DL

## 2024-07-22 ENCOUNTER — NON-APPOINTMENT (OUTPATIENT)
Age: 69
End: 2024-07-22

## 2024-07-23 ENCOUNTER — APPOINTMENT (OUTPATIENT)
Dept: UROLOGY | Facility: CLINIC | Age: 69
End: 2024-07-23
Payer: MEDICARE

## 2024-07-23 VITALS
TEMPERATURE: 97.3 F | RESPIRATION RATE: 18 BRPM | HEART RATE: 96 BPM | BODY MASS INDEX: 23.34 KG/M2 | SYSTOLIC BLOOD PRESSURE: 127 MMHG | DIASTOLIC BLOOD PRESSURE: 88 MMHG | WEIGHT: 149 LBS | OXYGEN SATURATION: 95 %

## 2024-07-23 DIAGNOSIS — R97.20 ELEVATED PROSTATE, SPECIFIC ANTIGEN [PSA]: ICD-10-CM

## 2024-07-23 DIAGNOSIS — N13.8 BENIGN PROSTATIC HYPERPLASIA WITH LOWER URINARY TRACT SYMPMS: ICD-10-CM

## 2024-07-23 DIAGNOSIS — N40.1 BENIGN PROSTATIC HYPERPLASIA WITH LOWER URINARY TRACT SYMPMS: ICD-10-CM

## 2024-07-23 DIAGNOSIS — C61 MALIGNANT NEOPLASM OF PROSTATE: ICD-10-CM

## 2024-07-23 PROCEDURE — G2211 COMPLEX E/M VISIT ADD ON: CPT

## 2024-07-23 PROCEDURE — 99214 OFFICE O/P EST MOD 30 MIN: CPT

## 2024-07-23 NOTE — ADDENDUM
[FreeTextEntry1] : Entered by Allen Matthews, acting as scribe for Dr. Pepe Shin. The documentation recorded by the scribe accurately reflects the service I personally performed and the decisions made by me.

## 2024-07-23 NOTE — LETTER BODY
[FreeTextEntry1] : Julien Benz [address] [place] [address] [phone]  Dear Dr. Benz,  REASON FOR VISIT: BPH. Prostate cancer.    This is a 68 year-old gentleman with prostate cancer. His previous abnormal bone scan demonstrated an indeterminate lesion on the left femur. His subsequent hip MRI was unremarkable. The patient underwent radiation therapy previously. The patient has finished radiation therapy. The patient returns for follow up. In terms of prostate cancer, his PSA is 0.14.  The patient reports taking Flomax BID regularly without any difficulties or side effects for his BPH. Patient reports stable symptoms on medical therapy. Patient reports of occasional rectal bleeding. Patient reports no pain. Patient denies any changes in health. The past medical history and family history and social history are unchanged. All other review of systems are negative. Patient denies any changes in medications. Medication list was reconciled.  On examination, the patient is a healthy-appearing gentleman in no acute distress. He is alert and oriented follows commands. He has normal mood and affect. He is normocephalic. Oral no thrush. Neck is supple. Respirations are unlabored. His abdomen is soft and nontender. Liver is nonpalpable. Bladder is nonpalpable. No CVA tenderness. Neurologically he is grossly intact. No peripheral edema. Skin without gross abnormality.    January 2024 PSA was 0.14. His BMP demonstrated normal renal functions, creatinine 0.93.    Assessment: BPH. Prostate cancer s/p XRT and androgen ablation.    I counseled the patient. In terms of his prostate cancer, patient completed radiation. I reviewed his PSA. His PSA was 0.14, WNL. I recommended he repeat PSA and BMP to ensure stability. In terms of his BPH, the patient reports stable symptoms on Flomax BID. I renewed the patient's prescription for Flomax BID today. I encouraged the patient to continue medications regularly as directed. Risks and alternatives were discussed. I answered the patient questions. The patient will follow-up as directed and will contact me with any questions or concerns. He will return in six months time for follow up. Thank you for the opportunity to participate in the care of this patient. I'll keep you updated on his progress.  Patient will continue longitudinal care for his complex and serious chronic condition.   Plan: Continue Flomax BID. Repeat PSA. BMP. Follow up in 6 months.

## 2024-07-24 LAB
ANION GAP SERPL CALC-SCNC: 14 MMOL/L
BUN SERPL-MCNC: 14 MG/DL
CALCIUM SERPL-MCNC: 9.4 MG/DL
CHLORIDE SERPL-SCNC: 104 MMOL/L
CO2 SERPL-SCNC: 22 MMOL/L
CREAT SERPL-MCNC: 0.92 MG/DL
EGFR: 90 ML/MIN/1.73M2
GLUCOSE SERPL-MCNC: 96 MG/DL
POTASSIUM SERPL-SCNC: 4.4 MMOL/L
PSA FREE FLD-MCNC: NORMAL %
PSA FREE SERPL-MCNC: <0.01 NG/ML
PSA SERPL-MCNC: 0.33 NG/ML
SODIUM SERPL-SCNC: 140 MMOL/L

## 2025-01-21 ENCOUNTER — NON-APPOINTMENT (OUTPATIENT)
Age: 70
End: 2025-01-21

## 2025-01-21 ENCOUNTER — APPOINTMENT (OUTPATIENT)
Dept: UROLOGY | Facility: CLINIC | Age: 70
End: 2025-01-21
Payer: MEDICARE

## 2025-01-21 VITALS
HEART RATE: 105 BPM | BODY MASS INDEX: 23.18 KG/M2 | DIASTOLIC BLOOD PRESSURE: 89 MMHG | WEIGHT: 148 LBS | SYSTOLIC BLOOD PRESSURE: 130 MMHG | RESPIRATION RATE: 18 BRPM | OXYGEN SATURATION: 98 % | TEMPERATURE: 97.5 F

## 2025-01-21 DIAGNOSIS — R94.8 ABNORMAL RESULTS OF FUNCTION STUDIES OF OTHER ORGANS AND SYSTEMS: ICD-10-CM

## 2025-01-21 DIAGNOSIS — C61 MALIGNANT NEOPLASM OF PROSTATE: ICD-10-CM

## 2025-01-21 DIAGNOSIS — R97.20 ELEVATED PROSTATE, SPECIFIC ANTIGEN [PSA]: ICD-10-CM

## 2025-01-21 LAB
ANION GAP SERPL CALC-SCNC: 13 MMOL/L
BUN SERPL-MCNC: 15 MG/DL
CALCIUM SERPL-MCNC: 9.9 MG/DL
CHLORIDE SERPL-SCNC: 104 MMOL/L
CO2 SERPL-SCNC: 26 MMOL/L
CREAT SERPL-MCNC: 1 MG/DL
EGFR: 81 ML/MIN/1.73M2
GLUCOSE SERPL-MCNC: 106 MG/DL
POTASSIUM SERPL-SCNC: 4.9 MMOL/L
PSA FREE FLD-MCNC: 7 %
PSA FREE SERPL-MCNC: 0.04 NG/ML
PSA SERPL-MCNC: 0.6 NG/ML
SODIUM SERPL-SCNC: 142 MMOL/L

## 2025-01-21 PROCEDURE — G2211 COMPLEX E/M VISIT ADD ON: CPT

## 2025-01-21 PROCEDURE — 99214 OFFICE O/P EST MOD 30 MIN: CPT

## 2025-07-01 ENCOUNTER — RX RENEWAL (OUTPATIENT)
Age: 70
End: 2025-07-01

## 2025-07-15 ENCOUNTER — APPOINTMENT (OUTPATIENT)
Dept: UROLOGY | Facility: CLINIC | Age: 70
End: 2025-07-15
Payer: MEDICARE

## 2025-07-15 VITALS
WEIGHT: 149 LBS | RESPIRATION RATE: 18 BRPM | SYSTOLIC BLOOD PRESSURE: 141 MMHG | BODY MASS INDEX: 23.34 KG/M2 | HEART RATE: 88 BPM | DIASTOLIC BLOOD PRESSURE: 88 MMHG | OXYGEN SATURATION: 96 % | TEMPERATURE: 97.5 F

## 2025-07-15 PROCEDURE — G2211 COMPLEX E/M VISIT ADD ON: CPT

## 2025-07-15 PROCEDURE — 99214 OFFICE O/P EST MOD 30 MIN: CPT

## 2025-07-22 LAB
ANION GAP SERPL CALC-SCNC: 15 MMOL/L
BUN SERPL-MCNC: 14 MG/DL
CALCIUM SERPL-MCNC: 9.4 MG/DL
CHLORIDE SERPL-SCNC: 106 MMOL/L
CO2 SERPL-SCNC: 20 MMOL/L
CREAT SERPL-MCNC: 0.86 MG/DL
EGFRCR SERPLBLD CKD-EPI 2021: 93 ML/MIN/1.73M2
GLUCOSE SERPL-MCNC: 90 MG/DL
POTASSIUM SERPL-SCNC: 4.1 MMOL/L
PSA FREE FLD-MCNC: 10 %
PSA FREE SERPL-MCNC: 0.08 NG/ML
PSA SERPL-MCNC: 0.84 NG/ML
SODIUM SERPL-SCNC: 141 MMOL/L